# Patient Record
Sex: MALE | Race: WHITE | NOT HISPANIC OR LATINO | Employment: OTHER | URBAN - METROPOLITAN AREA
[De-identification: names, ages, dates, MRNs, and addresses within clinical notes are randomized per-mention and may not be internally consistent; named-entity substitution may affect disease eponyms.]

---

## 2020-08-06 ENCOUNTER — OFFICE VISIT (OUTPATIENT)
Dept: VASCULAR SURGERY | Facility: CLINIC | Age: 60
End: 2020-08-06
Payer: COMMERCIAL

## 2020-08-06 DIAGNOSIS — I83.813 VARICOSE VEINS OF BOTH LOWER EXTREMITIES WITH PAIN: Primary | ICD-10-CM

## 2020-08-06 PROCEDURE — 99203 OFFICE O/P NEW LOW 30 MIN: CPT | Performed by: SURGERY

## 2020-08-06 NOTE — LETTER
8/6/2020         RE: Yann Mcghee  [address] [address] [place] [id number]        Dear Colleague,    Thank you for referring your patient, Yann Mcghee, [facility] [place]. Please see a copy of my visit note below.    [facility]    HPI:    Yann Mcghee is a pleasant 60 year old male who presents with complaints of bilateral lower extremity pain and varicose veins.  He has had varicose veins since the age of 31 and is seen them slowly enlarging time.    He wore compression hose for about 6 months intermittently without any significant improvement in his symptoms.  He denies deep vein thrombosis, superficial thrombophlebitis or hemorrhage.  He does note that his right greater than left ankles have become much more purple over the last couple of years.    The patient states that his father had very bad varicose veins.    PAST MEDICAL HISTORY:   Past Medical History:   Diagnosis Date     Ulcerative colitis, unspecified        PAST SURGICAL HISTORY:   Past Surgical History:   Procedure Laterality Date     C DECOMPRESS FASCIOTOMY LEG,ANT/LAT       HC COLONOSCOPY W BIOPSY  1998     REMOVAL OF SPERM DUCT(S)      Vasectomy       FAMILY HISTORY: History reviewed. No pertinent family history.    SOCIAL HISTORY:   Social History     Tobacco Use     Smoking status: Never Smoker   Substance Use Topics     Alcohol use: Yes     Alcohol/week: 5.0 standard drinks       REVIEW OF SYSTEMS: Review Of Systems  Skin: negative  Eyes: negative  Ears/Nose/Throat: negative  Respiratory: No shortness of breath, dyspnea on exertion, cough, or hemoptysis  Cardiovascular: negative  Gastrointestinal: heartburn  Genitourinary: negative  Musculoskeletal: negative and leg pain  Neurologic: negative  Psychiatric: negative  Hematologic/Lymphatic/Immunologic: negative  Endocrine: negative      Vital signs:  There were no vitals taken for this visit.    No current outpatient medications on  file.       PHYSICAL EXAM:  General: Pleasant, NAD.   HEENT: Normocephalic, atraumatic, external ears and nose normal.   Respiratory: Normal respiratory effort.   Cardiovascular: Pulse is regular.   Musculoskeletal: Gait and station normal.  The joints of his fingers and toes without deformity.  There is no cyanosis of his nailbeds.   EXTREMITIES: Right lower extremity: 6 to 8 mm varicosities coursing from the right proximal medial calf down to the anteromedial right foot with corona phlebectasia of his right medial ankle.  There is slight edema of his right ankle in the area of his large varicosities over the right medial malleolus.  Left lower extremity: 6 to 8 mm varicosities in a cluster about the left medial/posterior medial calf extending distally.  Corona phlebectasia is present of the left medial ankle, not as significant as the right.  PULSES: R/L (3=normal pulse, 0=no palpable pulse) dorsalis pedis: 3/3; posterior tibial: 3/3.      Neurologic: Grossly normal  Psychiatric: Mood, affect, judgment and insight are normal     ASSESSMENT:  Symptomatic, progressive bilateral lower extremity varicose veins.  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  Compression hose have been used in the past but were not beneficial to him, did not control his symptoms.    We discussed the pathophysiology of chronic venous insufficiency and the option of continued conservative management with use of compression hose, elevation, dietary measures and exercise.    If he wishes to pursue this further, he will need bilateral extremity venous competency study.  Further treatment will be based on findings.    If we found significant superficial axial incompetence, he may need endovenous ablation of incompetent veins.  We discussed details of radiofrequency ablation including risks of bleeding, infection, nerve injury, scarring, hyperpigmentation,  deep vein thrombosis, recanalization of the great saphenous vein and recurrent varicose veins.  Both he and his wife voiced understanding and he wishes to proceed with the ultrasound.    PLAN:  Bilateral lower extremity venous competency study     Beni Guevara MD    Dictated using Dragon voice recognition software which may result in transcription errors        [facility] PAT[facility]:                Again, thank you for allowing me to participate in the care of your patient.        Sincerely,        Beni Guevara MD

## 2020-08-06 NOTE — PROGRESS NOTES
[facility]    HPI:    Yann Mcghee is a pleasant 60 year old male who presents with complaints of bilateral lower extremity pain and varicose veins.  He has had varicose veins since the age of 31 and is seen them slowly enlarging time.    He wore compression hose for about 6 months intermittently without any significant improvement in his symptoms.  He denies deep vein thrombosis, superficial thrombophlebitis or hemorrhage.  He does note that his right greater than left ankles have become much more purple over the last couple of years.    The patient states that his father had very bad varicose veins.    PAST MEDICAL HISTORY:   Past Medical History:   Diagnosis Date     Ulcerative colitis, unspecified        PAST SURGICAL HISTORY:   Past Surgical History:   Procedure Laterality Date     C DECOMPRESS FASCIOTOMY LEG,ANT/LAT       HC COLONOSCOPY W BIOPSY  1998     REMOVAL OF SPERM DUCT(S)      Vasectomy       FAMILY HISTORY: History reviewed. No pertinent family history.    SOCIAL HISTORY:   Social History     Tobacco Use     Smoking status: Never Smoker   Substance Use Topics     Alcohol use: Yes     Alcohol/week: 5.0 standard drinks       REVIEW OF SYSTEMS: Review Of Systems  Skin: negative  Eyes: negative  Ears/Nose/Throat: negative  Respiratory: No shortness of breath, dyspnea on exertion, cough, or hemoptysis  Cardiovascular: negative  Gastrointestinal: heartburn  Genitourinary: negative  Musculoskeletal: negative and leg pain  Neurologic: negative  Psychiatric: negative  Hematologic/Lymphatic/Immunologic: negative  Endocrine: negative      Vital signs:  There were no vitals taken for this visit.    No current outpatient medications on file.       PHYSICAL EXAM:  General: Pleasant, NAD.   HEENT: Normocephalic, atraumatic, external ears and nose normal.   Respiratory: Normal respiratory effort.   Cardiovascular: Pulse is regular.   Musculoskeletal: Gait and station normal.  The joints of his  fingers and toes without deformity.  There is no cyanosis of his nailbeds.   EXTREMITIES: Right lower extremity: 6 to 8 mm varicosities coursing from the right proximal medial calf down to the anteromedial right foot with corona phlebectasia of his right medial ankle.  There is slight edema of his right ankle in the area of his large varicosities over the right medial malleolus.  Left lower extremity: 6 to 8 mm varicosities in a cluster about the left medial/posterior medial calf extending distally.  Corona phlebectasia is present of the left medial ankle, not as significant as the right.  PULSES: R/L (3=normal pulse, 0=no palpable pulse) dorsalis pedis: 3/3; posterior tibial: 3/3.      Neurologic: Grossly normal  Psychiatric: Mood, affect, judgment and insight are normal     ASSESSMENT:  Symptomatic, progressive bilateral lower extremity varicose veins.  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  Compression hose have been used in the past but were not beneficial to him, did not control his symptoms.    We discussed the pathophysiology of chronic venous insufficiency and the option of continued conservative management with use of compression hose, elevation, dietary measures and exercise.    If he wishes to pursue this further, he will need bilateral extremity venous competency study.  Further treatment will be based on findings.    If we found significant superficial axial incompetence, he may need endovenous ablation of incompetent veins.  We discussed details of radiofrequency ablation including risks of bleeding, infection, nerve injury, scarring, hyperpigmentation, deep vein thrombosis, recanalization of the great saphenous vein and recurrent varicose veins.  Both he and his wife voiced understanding and he wishes to proceed with the ultrasound.    PLAN:  Bilateral lower extremity venous competency study     Beni Pruitt  MD [place]    Dictated using Dragon voice recognition software which may result in transcription errors        [facility] PAT[facility]:

## 2020-08-17 ENCOUNTER — OFFICE VISIT (OUTPATIENT)
Dept: VASCULAR SURGERY | Facility: CLINIC | Age: 60
End: 2020-08-17
Attending: SURGERY
Payer: COMMERCIAL

## 2020-08-17 ENCOUNTER — ANCILLARY PROCEDURE (OUTPATIENT)
Dept: ULTRASOUND IMAGING | Facility: CLINIC | Age: 60
End: 2020-08-17
Attending: SURGERY
Payer: COMMERCIAL

## 2020-08-17 DIAGNOSIS — I83.813 VARICOSE VEINS OF BOTH LOWER EXTREMITIES WITH PAIN: Primary | ICD-10-CM

## 2020-08-17 DIAGNOSIS — I83.813 VARICOSE VEINS OF BOTH LOWER EXTREMITIES WITH PAIN: ICD-10-CM

## 2020-08-17 PROCEDURE — 93970 EXTREMITY STUDY: CPT | Performed by: SURGERY

## 2020-08-17 PROCEDURE — 99213 OFFICE O/P EST LOW 20 MIN: CPT | Performed by: SURGERY

## 2020-08-17 NOTE — PROGRESS NOTES
[facility] Note    Yann Mcghee presents in follow-up of bilateral lower extremity pain and varicose veins.  Please see my consultation of 8/6/2020 for details.  He returned today for bilateral extremity venous competency study.    Physical Exam  General: Pleasant male in no acute distress.  Blood pressure 189/118 (repeated- 218/109), pulse 82  Extremities: Right lower extremity: 8 mm varicosities over the distal medial right calf with 6 mm varicosities from the proximal medial right calf extending distally.  Several small eschars are noted over his anterior/anteromedial right leg secondary to pruritus and scratching.  Phlebectasia of the right medial ankle.  Trace edema of the right ankle.  Left lower extremity: 6 to 8 mm varicosities from the left proximal medial calf down to just above the left ankle.  Corona phlebectasia of the left medial ankle.  No significant edema.    He has easily palpable dorsalis pedis and posterior tibial pulses    Ultrasound:  Right lower extremity: No evidence of deep vein thrombosis.  His right common femoral and proximal femoral veins are incompetent but the remaining deep vein valves are competent.    The right great saphenous vein is incompetent from the saphenofemoral junction to the ankle, measures 12.4 mm diameter distally with reflux time of 2.9 seconds.  It is the source of multiple incompetent vein branches measuring up to 8.1 mm in diameter with reflux time of 2.5 seconds of the calf segment the great saphenous vein.    The right small saphenous vein is incompetent, measures 4.9 mm in diameter with reflux time of 3.2 seconds.    The right Vein of Giacomini is incompetent but small, the right ankle accessory saphenous vein is competent.    There is a 4.5 mm diameter incompetent  indicating with the small saphenous vein on the mid posterior calf.    Left lower extremity: No evidence of deep vein thrombosis.  Left common femoral through mid femoral veins  are incompetent but the remaining deep vein valves are competent.    The left great saphenous vein is incompetent, measures 11.3 mm in diameter distally with reflux time of 3.6 seconds.  It is the source of multiple incompetent vein branches measuring up to 8.5 mm in diameter with reflux time of 2.4 seconds of the knee segment of the great saphenous vein.    Left small saphenous vein is competent except for short segment distally.  The left anterior especially saphenous vein is competent.  The Vein of Giacomini is not visualized.    He has several small incompetent perforators.    Assessment:  CEAP 3 bilateral extremity chronic venous insufficiency primarily due to great saphenous vein incompetence.  We discussed options of continued conservative management with compression hose, elevation, dietary measures and exercise.  These symptoms have progressed despite the use of compression hose, elevation and exercise.    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    Risk of conservative management including superficial thrombophlebitis, bleeding and progression of the disease process were discussed.  If he chose treatment, he would be a candidate for radiofrequency ablation of his great saphenous veins bilaterally.  I would likely not treat his right small saphenous vein at this time.    Details of procedure including risks of bleeding, infection, nerve injury, scarring, hyperpigmentation, deep antibiosis, recanalization of the great saphenous vein and recurrent varicose veins were discussed.  He voiced understanding and his questions were answered.  I will perform concomitant phlebectomies given the large size of his varicosities.    Significant hypertension on today's exam.  I have asked him to check his blood pressure 2-3 times weekly at the same time of day each time and to notify Dr. Bhargav Mccabe if his blood  pressure is greater than 140/80.  Also talked about limiting sodium intake and beginning exercise.    Plan:  Radiofrequency ablation of his great saphenous veins with bilateral phlebectomies.  We will treat his right leg initially to be followed 2 to 4 weeks later by treatment of his left lower extremity.  Risks and details were discussed.  He voiced understanding and his questions were answered.    Please see above recommendations for hypertension.    Beni Guevara MD    Dictated using Dragon voice recognition software which may result in transcription errors.

## 2020-08-17 NOTE — LETTER
8/17/2020         RE: Yann Mcghee  [address] [address] [place] [id number]        Dear Colleague,    Thank you for referring your patient, Yann Mcghee, [facility] [place]. Please see a copy of my visit note below.    [facility]    Yann Mcghee presents in follow-up of bilateral lower extremity pain and varicose veins.  Please see my consultation of 8/6/2020 for details.  He returned today for bilateral extremity venous competency study.    Physical Exam  General: Pleasant male in no acute distress.  Blood pressure 189/118 (repeated- 218/109), pulse 82  Extremities: Right lower extremity: 8 mm varicosities over the distal medial right calf with 6 mm varicosities from the proximal medial right calf extending distally.  Several small eschars are noted over his anterior/anteromedial right leg secondary to pruritus and scratching.  Phlebectasia of the right medial ankle.  Trace edema of the right ankle.  Left lower extremity: 6 to 8 mm varicosities from the left proximal medial calf down to just above the left ankle.  Corona phlebectasia of the left medial ankle.  No significant edema.    He has easily palpable dorsalis pedis and posterior tibial pulses    Ultrasound:  Right lower extremity: No evidence of deep vein thrombosis.  His right common femoral and proximal femoral veins are incompetent but the remaining deep vein valves are competent.    The right great saphenous vein is incompetent from the saphenofemoral junction to the ankle, measures 12.4 mm diameter distally with reflux time of 2.9 seconds.  It is the source of multiple incompetent vein branches measuring up to 8.1 mm in diameter with reflux time of 2.5 seconds of the calf segment the great saphenous vein.    The right small saphenous vein is incompetent, measures 4.9 mm in diameter with reflux time of 3.2 seconds.    The right Vein of Giacomini is incompetent but small, the right ankle  accessory saphenous vein is competent.    There is a 4.5 mm diameter incompetent  indicating with the small saphenous vein on the mid posterior calf.    Left lower extremity: No evidence of deep vein thrombosis.  Left common femoral through mid femoral veins are incompetent but the remaining deep vein valves are competent.    The left great saphenous vein is incompetent, measures 11.3 mm in diameter distally with reflux time of 3.6 seconds.  It is the source of multiple incompetent vein branches measuring up to 8.5 mm in diameter with reflux time of 2.4 seconds of the knee segment of the great saphenous vein.    Left small saphenous vein is competent except for short segment distally.  The left anterior especially saphenous vein is competent.  The Vein of Giacomini is not visualized.    He has several small incompetent perforators.    Assessment:  CEAP 3 bilateral extremity chronic venous insufficiency primarily due to great saphenous vein incompetence.  We discussed options of continued conservative management with compression hose, elevation, dietary measures and exercise.  These symptoms have progressed despite the use of compression hose, elevation and exercise.    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    Risk of conservative management including superficial thrombophlebitis, bleeding and progression of the disease process were discussed.  If he chose treatment, he would be a candidate for radiofrequency ablation of his great saphenous veins bilaterally.  I would likely not treat his right small saphenous vein at this time.    Details of procedure including risks of bleeding, infection, nerve injury, scarring, hyperpigmentation, deep antibiosis, recanalization of the great saphenous vein and recurrent varicose veins were discussed.  He voiced understanding and his questions were answered.  I will  perform concomitant phlebectomies given the large size of his varicosities.    Plan:  Radiofrequency ablation of his great saphenous veins with bilateral phlebectomies.  We will treat his right leg initially to be followed 2 to 4 weeks later by treatment of his left lower extremity.  Risks and details were discussed.  He voiced understanding and his questions were answered.    Beni Guevara MD    Dictated using Dragon voice recognition software which may result in transcription errors.            Again, thank you for allowing me to participate in the care of your patient.        Sincerely,        Beni Guevara MD

## 2020-08-17 NOTE — Clinical Note
First procedure: Radiofrequency ablation of the right great saphenous vein with 10-20 medically necessary phlebectomies, 1-1/2-hour for procedure  Second procedure: Radiofrequency ablation of the left great saphenous vein with 10-20 medically necessary phlebectomies, 1-1/2 hours for procedure.

## 2020-10-21 DIAGNOSIS — I83.813 VARICOSE VEINS OF BOTH LOWER EXTREMITIES WITH PAIN: Primary | ICD-10-CM

## 2020-11-13 ENCOUNTER — TELEPHONE (OUTPATIENT)
Dept: VASCULAR SURGERY | Facility: CLINIC | Age: 60
End: 2020-11-13

## 2020-11-13 DIAGNOSIS — I83.813 VARICOSE VEINS OF BOTH LOWER EXTREMITIES WITH PAIN: Primary | ICD-10-CM

## 2020-11-13 NOTE — TELEPHONE ENCOUNTER
[facility]    Preoperative Nurse Call    Procedure: Right GSV with phlebs  Date: 11/23/2020  Time: 0900  Check in time: 0800    Called and left detailed message. Informed patient: when to check in (0800) to sign consent, to bring their preop medications in their original bottle with them (3mg ativan, 0.1mg clonidine). Patient will take the medications after signing the consent to the procedure. Instructed patient to wear a mask and wear loose-fitting comfortable clothing. Ensured patient has a /someone that will be responsible for them the rest of the day. No visitors are allowed in the clinic, so  can either stay in the parking lot or leave. If  does leave, IF POSSIBLE, we ask that they do not go more than 15-20 mins from our clinic. Once procedure is completed, we will keep patient in recovery for 30-45 mins, and call  with aftercare instructions. Informed patient, that if possible, they should sit in the backseat to elevate their leg on the ride home.    Pt needs thigh-high compression hose for procedure. Status of the hose: patient's hose order has been faxed 10/21. Patient can call [facility] in [place] if he has not receive his socks by now.    Special instructions: Patient will have 2 procedures, 2nd procedure scheduling is pending. Therefore 2 sets of medication in each bottle of clonidine (C as in cat) and lorazepam (L as in lion) were sent to [facility] in [facility]. Patient will be instructed to take one of the 2 sets after signed consent. 2nd set will be saved for when the 2nd procedure is scheduled.      Special COVID-19 instructions: Patient aware they need to wear a mask to our clinic and during their procedure. Patient aware that their  cannot come into the clinic and must wait in car. Nurse will call pt's  when procedure is over.    Patient understands that if they have any of the following symptoms (fever, cough, shortness of breath, rash), they need to notify us  immediately to cancel their procedure and will have to reschedule for a later date.    Patient is in agreement with preoperative information and has no further questions.    Jb Johnson RN

## 2020-11-17 RX ORDER — CLONIDINE HYDROCHLORIDE 0.1 MG/1
TABLET ORAL
Qty: 2 TABLET | Refills: 0 | Status: SHIPPED | OUTPATIENT
Start: 2020-11-17 | End: 2021-02-01

## 2020-11-17 RX ORDER — LORAZEPAM 1 MG/1
TABLET ORAL
Qty: 6 TABLET | Refills: 0 | Status: SHIPPED | OUTPATIENT
Start: 2020-11-17 | End: 2021-02-01

## 2020-11-17 NOTE — TELEPHONE ENCOUNTER
Called and left a voicemail message with the same information as last call (see last note). Remind patient clinic is still working on sending his 2 preop medications to the pharmacy. Patient to call clinic this Thursday or Friday if his pharmacy has not call him to  the medication. Otherwise, will see patient on Monday.

## 2020-12-18 ENCOUNTER — TELEPHONE (OUTPATIENT)
Dept: VASCULAR SURGERY | Facility: CLINIC | Age: 60
End: 2020-12-18

## 2020-12-18 NOTE — TELEPHONE ENCOUNTER
[facility]    Preoperative Nurse Call    Procedure: Right GSV with phlebs  Date: 12/21/2020  Time: 0900  Check in time: 0800    Called and spoke to patient/left detailed message. Informed patient: when to check in (0800) to sign consent, to bring their preop medications in their original bottle with them (3mg ativan, 0.1mg clonidine). Patient will take the medications after signing the consent to the procedure. Instructed patient to wear a mask, wear loose-fitting comfortable clothing, and bring their compression hose. Ensured patient has a /someone that will be responsible for them the rest of the day. No visitors are allowed in the clinic, so  can either stay in the parking lot or leave. If  does leave, IF POSSIBLE, we ask that they do not go more than 15-20 mins from our clinic. Once procedure is completed, we will keep patient in recovery for 30-45 mins, and call  with aftercare instructions. Informed patient, that if possible, they should sit in the backseat to elevate their leg on the ride home.    Pt needs thigh-high compression hose for procedure. Status of the hose: patient has them.    Special instructions: Informed patient sedation meds sent to pharmacy [facility] in [facility]. Patient will pick them up.    Special COVID-19 instructions: Patient aware they need to wear a mask to our clinic and during their procedure. Patient aware that their  cannot come into the clinic and must wait in car. Nurse will call pt's  when procedure is over.    Patient understands that if they have any of the following symptoms (fever, cough, shortness of breath, rash), they need to notify us immediately to cancel their procedure and will have to reschedule for a later date.    Patient is in agreement with preoperative information and has no further questions.    Jb Johnson RN

## 2020-12-21 ENCOUNTER — OFFICE VISIT (OUTPATIENT)
Dept: VASCULAR SURGERY | Facility: CLINIC | Age: 60
End: 2020-12-21
Payer: COMMERCIAL

## 2020-12-21 VITALS — SYSTOLIC BLOOD PRESSURE: 138 MMHG | HEART RATE: 67 BPM | OXYGEN SATURATION: 95 % | DIASTOLIC BLOOD PRESSURE: 98 MMHG

## 2020-12-21 DIAGNOSIS — I83.811 VARICOSE VEINS OF RIGHT LOWER EXTREMITY WITH PAIN: ICD-10-CM

## 2020-12-21 PROCEDURE — 36475 ENDOVENOUS RF 1ST VEIN: CPT | Mod: RT | Performed by: SURGERY

## 2020-12-21 PROCEDURE — 37766 PHLEB VEINS - EXTREM 20+: CPT | Mod: RT | Performed by: SURGERY

## 2020-12-21 RX ORDER — HYDROCODONE BITARTRATE AND ACETAMINOPHEN 5; 325 MG/1; MG/1
1 TABLET ORAL EVERY 6 HOURS PRN
Qty: 2 TABLET | Refills: 0 | Status: SHIPPED | OUTPATIENT
Start: 2020-12-21 | End: 2021-02-01

## 2020-12-21 NOTE — NURSING NOTE
Patient's diastolic blood pressure in the high 90's-110's. No complaint of symptoms. VSS remain stable. Encouraged patient to see his PCP to follow up, as also has been encouraged by Dr. Guevara on initial visit. Patient acknowledges understanding.

## 2020-12-21 NOTE — PROGRESS NOTES
[facility] Procedure Note    Indications:  Right leg pain and varicose veins recalcitrant to conservative measures    Procedure:  1. Radiofrequency ablation right great saphenous vein  2. Multiple, medically necessary stab phlebectomies right lower extremity (greater than 20 stabs)    Surgeon  CALVIN Guevara MD    Procedure Description  Details of the procedure including risks of bleeding, infection, nerve injury, scarring, hyperpigmentation, deep vein thrombosis, recanalization of the great saphenous vein and recurrent varicose veins all discussed.  The patient voiced understanding and wished to proceed.  Informed consent was obtained.     I had the patient stand and marked varicosities coursing from the right knee down to the dorsum of his right foot with indelible marker.  We then proceeded the operating room, had the patient lie supine on the operating table, then prepped and draped the right lower extremity sterilely.    We took a timeout to confirm the appropriate operative site and procedure: Radiofrequency ablation of the right great saphenous vein with medically necessary phlebectomies    VNUS Closure  I imaged the right lower extremity easily identifying the right great saphenous vein, noting that it was continuous down to approximately 8 cm below the knee where it broke through the fascia and became varicosities coursing down the leg.  The vein was also somewhat superficial in the distal thigh and proximal leg.  I infiltrated the skin overlying the vein approximately 8 cm below the knee, placed a micropuncture needle into the vein followed by a micropuncture guidewire and a 7 Wol[place] sheath.    We passed the closure fast device through the sheath, positioning of the tip of the device 1.87 centimeters from the saphenofemoral junction under ultrasound guidance with the operating table in Trendelenburg position.  Tumescent anesthetic was injected along the course of the vein under ultrasound guidance  with care to confirm catheter tip position 1.87 cm from the saphenofemoral junction prior to infiltrating the tissues in this location.  Where the vein was slightly more superficial in the distal thigh and proximal calf, I instilled after tumescent to deepen the vein to greater than 1 cm deep to the skin.  The same tumescent anesthetic was injected around each of the marked varicosities.    After allowing the block to take effect and after confirming appropriate position, I applied compression to the proximal thigh great saphenous vein with the ultrasound probe and additional width 2 fingerbreadths treating the first two 7 cm increments of the vein with 2 RF sessions each.  The remaining vein was treated with 1 RF session to each 7cm increment with the exception of segments of vein where energy readings were higher requiring additional treatments.  We treated down to approximately 8 cm below the knee.  The patient was comfortable throughout.    After completing the pullback, I reimaged the vein and the vein to be non-compressible and closed.  The saphenofemoral junction and common femoral veins were fully compressible and free of thrombus. The sheath and the catheter were removed and hemostasis secured with pressure.    Phlebectomies  We made stab wounds beside each of the marked varicosities with 11 scalpel, retrieved the veins with either vein hooks or shanika hooks, clamped them with mosquito clamps and a avulsed them.  Hemostasis was secured with pressure.    After completing the phlebectomies, the leg was cleaned with saline solution and petroleum jelly was applied to the skin.  The leg was then dressed with ABD pads, cast padding and an Ace bandage from the toes to the groin.   We observed the patient for 30 minutes to ensure excellent hemostasis then took him to his car in a wheelchair.  Post procedure instructions were given to the patient and his wife in verbal and written form.  They both voiced  understanding and their questions were answered.    The patient tolerated the procedure well without evidence of allergic reaction or other complications and will return in 72 hours for a right lower extremity venous ultrasound.    Pooja Closure    Date/Time: 2020 10:07 AM  Performed by: Beni Guevara MD  Authorized by: Beni Guevara MD     Time out: Immediately prior to the procedure a time out was called    Preparation: Patient was prepped and draped in usual sterile fashion    1st Assist:  Althea Penn CST/CHRIS  Circulator:  Jb Johnson RN  Procedure:  VNUS  Procedure side:  Right  One Vein    Vein Treated:  GSV  Patient tolerance:  Patient tolerated the procedure well with no immediate complications  Phlebectomy    Date/Time: 2020 10:07 AM  Performed by: Beni Guevara MD  Authorized by: Beni Guevara MD     Procedure:  Phlebectomies  Type:  Medically Necessary  Procedure side:  Right  Stabs:  >20  Patient tolerance:  Patient tolerated the procedure well with no immediate complications  Wrap/Hose:  Wraps        Flowsheet Data 2020   Procedure Start Time:  9:04 AM   Prep: Chloraprep   Side: Right   Tx Length (cm): Right GSV: 52   Junction (cm): Right GSV: 1.87   RF Cycles: Right GSV: 11   RF TX Time (Minutes): Right GSV: 3:40   # PHLEB Sites: Right le   Sedation taken: Yes   Pre Pt. Physical / Cognitive Limitations: WNL   TOTAL Local anesthesia Injected (ml): 3   Max Volume Local Anesthesia (ml): 11   TOTAL Tumescent Injected volume (ml): 462   Max Volume Tumescent (ml): 572   Post Pt. Physical / Cognitive Limitations: WNL   Procedure End Time:  9:59 AM   D/C Instructions given, states readiness to leave and escorted to car: Yes       LILLIAN Guevara MD    Dictated using Dragon voice recognition software which may result in transcription errorsPre-procedure Nursing Note    Yann Mcghee presents to clinic for Vein Procedure  .    /Person Responsible for Patient: Sukhdeep (wife)  Phone Number: [phone] (ok to LVM)    Prophylactic Medication:N/A   Sedation Medication: Ativan, 3 mg ,   Time Taken: 0814 and Clonidine, 1mg,   Time Taken: 0814  Compression Stockings: Patient brought  The procedure is being performed on RLE.  Patient understanding of procedure matches consent? YES    Patient's pre-procedure medications verified by RNJb.    Jb Johnson RN on 12/21/2020 at 8:13 AM

## 2020-12-21 NOTE — LETTER
12/21/2020         RE: Yann Mcghee  [address] [address] [place] [address]  [place] [address]        Dear Colleague,    Thank you for referring your patient, Yann Mcghee, to the [facility] [place]. Please see a copy of my visit note below.        [facility] Procedure Note    Indications:  Right leg pain and varicose veins recalcitrant to conservative measures    Procedure:  1. Radiofrequency ablation right great saphenous vein  2. Multiple, medically necessary stab phlebectomies right lower extremity (greater than 20 stabs)    Surgeon  CALVIN Guevara MD    Procedure Description  Details of the procedure including risks of bleeding, infection, nerve injury, scarring, hyperpigmentation, deep vein thrombosis, recanalization of the great saphenous vein and recurrent varicose veins all discussed.  The patient voiced understanding and wished to proceed.  Informed consent was obtained.     I had the patient stand and marked varicosities coursing from the right knee down to the dorsum of his right foot with indelible marker.  We then proceeded the operating room, had the patient lie supine on the operating table, then prepped and draped the right lower extremity sterilely.    We took a timeout to confirm the appropriate operative site and procedure: Radiofrequency ablation of the right great saphenous vein with medically necessary phlebectomies    VNUS Closure  I imaged the right lower extremity easily identifying the right great saphenous vein, noting that it was continuous down to approximately 8 cm below the knee where it broke through the fascia and became varicosities coursing down the leg.  The vein was also somewhat superficial in the distal thigh and proximal leg.  I infiltrated the skin overlying the vein approximately 8 cm below the knee, placed a micropuncture needle into the vein followed by a micropuncture guidewire and a 7 [place] sheath.    We passed the closure fast device through the  sheath, positioning of the tip of the device 1.87 centimeters from the saphenofemoral junction under ultrasound guidance with the operating table in Trendelenburg position.  Tumescent anesthetic was injected along the course of the vein under ultrasound guidance with care to confirm catheter tip position 1.87 cm from the saphenofemoral junction prior to infiltrating the tissues in this location.  Where the vein was slightly more superficial in the distal thigh and proximal calf, I instilled after tumescent to deepen the vein to greater than 1 cm deep to the skin.  The same tumescent anesthetic was injected around each of the marked varicosities.    After allowing the block to take effect and after confirming appropriate position, I applied compression to the proximal thigh great saphenous vein with the ultrasound probe and additional width 2 fingerbreadths treating the first two 7 cm increments of the vein with 2 RF sessions each.  The remaining vein was treated with 1 RF session to each 7cm increment with the exception of segments of vein where energy readings were higher requiring additional treatments.  We treated down to approximately 8 cm below the knee.  The patient was comfortable throughout.    After completing the pullback, I reimaged the vein and the vein to be non-compressible and closed.  The saphenofemoral junction and common femoral veins were fully compressible and free of thrombus. The sheath and the catheter were removed and hemostasis secured with pressure.    Phlebectomies  We made stab wounds beside each of the marked varicosities with 11 scalpel, retrieved the veins with either vein hooks or shanika hooks, clamped them with mosquito clamps and a avulsed them.  Hemostasis was secured with pressure.    After completing the phlebectomies, the leg was cleaned with saline solution and petroleum jelly was applied to the skin.  The leg was then dressed with ABD pads, cast padding and an Ace bandage  from the toes to the groin.   We observed the patient for 30 minutes to ensure excellent hemostasis then took him to his car in a wheelchair.  Post procedure instructions were given to the patient and his wife in verbal and written form.  They both voiced understanding and their questions were answered.    The patient tolerated the procedure well without evidence of allergic reaction or other complications and will return in 72 hours for a right lower extremity venous ultrasound.    Pooja Closure    Date/Time: 2020 10:07 AM  Performed by: Beni Guevara MD  Authorized by: Beni Guevara MD     Time out: Immediately prior to the procedure a time out was called    Preparation: Patient was prepped and draped in usual sterile fashion    1st Assist:  Althea Penn CST/CHRIS  Circulator:  Jb Johnson RN  Procedure:  VNUS  Procedure side:  Right  One Vein    Vein Treated:  GSV  Patient tolerance:  Patient tolerated the procedure well with no immediate complications  Phlebectomy    Date/Time: 2020 10:07 AM  Performed by: Beni Guevara MD  Authorized by: Beni Guevara MD     Procedure:  Phlebectomies  Type:  Medically Necessary  Procedure side:  Right  Stabs:  >20  Patient tolerance:  Patient tolerated the procedure well with no immediate complications  Wrap/Hose:  Wraps        Flowsheet Data 2020   Procedure Start Time:  9:04 AM   Prep: Chloraprep   Side: Right   Tx Length (cm): Right GSV: 52   Junction (cm): Right GSV: 1.87   RF Cycles: Right GSV: 11   RF TX Time (Minutes): Right GSV: 3:40   # PHLEB Sites: Right le   Sedation taken: Yes   Pre Pt. Physical / Cognitive Limitations: WNL   TOTAL Local anesthesia Injected (ml): 3   Max Volume Local Anesthesia (ml): 11   TOTAL Tumescent Injected volume (ml): 462   Max Volume Tumescent (ml): 572   Post Pt. Physical / Cognitive Limitations: WNL   Procedure End Time:  9:59 AM   D/C Instructions given, states  readiness to leave and escorted to car: Yes       LILLIAN Guevara MD    Dictated using Dragon voice recognition software which may result in transcription errorsPre-procedure Nursing Note    Yann Mcghee presents to clinic for Vein Procedure  .   /Person Responsible for Patient: Sukhdeep (wife)  Phone Number: [phone] (ok to LVM)    Prophylactic Medication:N/A   Sedation Medication: Ativan, 3 mg ,   Time Taken: 0814 and Clonidine, 1mg,   Time Taken: 0814  Compression Stockings: Patient brought  The procedure is being performed on RLE.  Patient understanding of procedure matches consent? YES    Patient's pre-procedure medications verified by RN, Jb Johnson.    Jb Johnson RN on 12/21/2020 at 8:13 AM      Again, thank you for allowing me to participate in the care of your patient.        Sincerely,        Beni Guevara MD

## 2020-12-23 ENCOUNTER — ANCILLARY PROCEDURE (OUTPATIENT)
Dept: ULTRASOUND IMAGING | Facility: CLINIC | Age: 60
End: 2020-12-23
Attending: SURGERY
Payer: COMMERCIAL

## 2020-12-23 ENCOUNTER — OFFICE VISIT (OUTPATIENT)
Dept: VASCULAR SURGERY | Facility: CLINIC | Age: 60
End: 2020-12-23
Attending: SURGERY
Payer: COMMERCIAL

## 2020-12-23 DIAGNOSIS — I83.811 VARICOSE VEINS OF RIGHT LOWER EXTREMITY WITH PAIN: Primary | ICD-10-CM

## 2020-12-23 DIAGNOSIS — I83.813 VARICOSE VEINS OF BOTH LOWER EXTREMITIES WITH PAIN: ICD-10-CM

## 2020-12-23 PROCEDURE — 93971 EXTREMITY STUDY: CPT | Mod: RT | Performed by: SURGERY

## 2020-12-23 PROCEDURE — 99207 PR NO CHARGE NURSE ONLY: CPT

## 2020-12-23 RX ORDER — IBUPROFEN 200 MG
200 TABLET ORAL EVERY 4 HOURS PRN
COMMUNITY

## 2020-12-23 NOTE — LETTER
2020         RE: Yann Mcghee  [address] [address] [place] [address]  [place] [address]        Dear Colleague,    Thank you for referring your patient, Yann Mcghee, to the [facility] [place]. Please see a copy of my visit note below.        Postoperative Nurse Note    Patient is here for their 48 postoperative visit.    Procedure: Called patient and moved appointment to 8:45 arrival for  9:00 US. 72 hour post op Right leg VNUS closure GSV(med nec), 10-20 stab phlebs(med nec)  Procedure Date: on 20   Surgeon: Dr Guevara    Ultrasound Result: Right lower extremity negative for DVT. Right GSV is closed 5.7mm from the SFJ to the distal calf. Acute occlusion thrombus of varicose veins off proximal calf.    Physical Exam: Incisions are approximated without signs of infection.  Ecchymosis:very minimal on right medial thigh and calf near knee  Swelling: no swelling  Paresthesia: denies numbness    Current Pain Ratin/10. Using Ibuprofen for discomfort. States it helps.    Patient Questions or Concerns: Wants to schedule left leg surgery and will be contacting [place] (surgery scheduler)    Reviewed postoperative instructions with patient and provided them with written material of common things to expect from their procedure.     Patient's Next [facility] Appointment: 21 6 week f/u and if 2nd surgery is schedule before this appt this will be rescheduled    Gopal Arguello RN      Again, thank you for allowing me to participate in the care of your patient.        Sincerely,         VEIN NURSE

## 2020-12-23 NOTE — PROGRESS NOTES
Postoperative Nurse Note    Patient is here for their 48 postoperative visit.    Procedure: Called patient and moved appointment to 8:45 arrival for  9:00 US. 72 hour post op Right leg VNUS closure GSV(med nec), 10-20 stab phlebs(med nec)  Procedure Date: on 20   Surgeon: Dr Guevara    Ultrasound Result: Right lower extremity negative for DVT. Right GSV is closed 5.7mm from the SFJ to the distal calf. Acute occlusion thrombus of varicose veins off proximal calf.    Physical Exam: Incisions are approximated without signs of infection.  Ecchymosis:very minimal on right medial thigh and calf near knee  Swelling: no swelling  Paresthesia: denies numbness    Current Pain Ratin/10. Using Ibuprofen for discomfort. States it helps.    Patient Questions or Concerns: Wants to schedule left leg surgery and will be contacting [facility] (surgery scheduler)    Reviewed postoperative instructions with patient and provided them with written material of common things to expect from their procedure.     Patient's Next [facility] Appointment: 21 6 week f/u and if 2nd surgery is schedule before this appt this will be rescheduled    Gopal Arguello RN

## 2021-01-15 ENCOUNTER — HEALTH MAINTENANCE LETTER (OUTPATIENT)
Age: 61
End: 2021-01-15

## 2021-02-01 ENCOUNTER — OFFICE VISIT (OUTPATIENT)
Dept: VASCULAR SURGERY | Facility: CLINIC | Age: 61
End: 2021-02-01
Payer: COMMERCIAL

## 2021-02-01 DIAGNOSIS — I83.813 VARICOSE VEINS OF BOTH LOWER EXTREMITIES WITH PAIN: Primary | ICD-10-CM

## 2021-02-01 PROCEDURE — 99207 PR NO CHARGE LOS: CPT | Performed by: SURGERY

## 2021-02-01 RX ORDER — LOSARTAN POTASSIUM 25 MG/1
25 TABLET ORAL DAILY
COMMUNITY
Start: 2021-01-08

## 2021-02-01 NOTE — LETTER
"    2/1/2021         RE: Yann Mcghee  [address] [address] [place] [address]        Dear Colleague,    Thank you for referring your patient, Yann Mcghee, to the [facility] [place]. Please see a copy of my visit note below.    [facility] vein clinic [place] 6-week postop  Oscar Mcghee returns in 6-week follow-up of radiofrequency ablation of his right great saphenous vein with multiple phlebectomies.  Overall he is doing well and says his leg pain has improved.  He does admit to some \"pulling\" in his right thigh but states this is improving.    Physical exam   Right lower extremity: Phlebectomy sites are healing well.  There is little if any ecchymosis remaining.  There is mild induration along some of the phlebectomy sites.  There is a residual varicosity about the medial proximal calf but otherwise I do not appreciate other significant varicose veins.    Is post procedure ultrasound revealed that the right great saphenous vein was closed 5.7 mm from the saphenofemoral junction to the distal calf.  There was no deep vein thrombosis.    Impression  Good result 6 weeks status post radiofrequency ablation of the right great saphenous vein with phlebectomies.  There is a single residual varicosity about his right proximal medial calf that we can treat with no charge sclerotherapy at the time of his left leg treatment.    He will be taking a trip beginning April 1 and wonders if he should have his left leg treated prior to the trip or following the trip.  I would recommend getting his leg treated after he returns from his vacation.  We will discuss with [facility] or [place]encia in our office to get scheduled.    Plan  Radiofrequency ablation of the left great saphenous vein with medically necessary phlebectomies as previously ordered.  We will perform a limited sclerotherapy of a residual right medial calf varicosity at the same setting.    LILLIAN Guevara MD    Dictated using [place] " voice recognition software which may result in transcription errors      Again, thank you for allowing me to participate in the care of your patient.        Sincerely,        Beni Guevara MD

## 2021-02-01 NOTE — PROGRESS NOTES
"[facility] vein clinic [place] 6-week postop  Oscar Mcghee returns in 6-week follow-up of radiofrequency ablation of his right great saphenous vein with multiple phlebectomies.  Overall he is doing well and says his leg pain has improved.  He does admit to some \"pulling\" in his right thigh but states this is improving.    Physical exam   Right lower extremity: Phlebectomy sites are healing well.  There is little if any ecchymosis remaining.  There is mild induration along some of the phlebectomy sites.  There is a residual varicosity about the medial proximal calf but otherwise I do not appreciate other significant varicose veins.    Is post procedure ultrasound revealed that the right great saphenous vein was closed 5.7 mm from the saphenofemoral junction to the distal calf.  There was no deep vein thrombosis.    Impression  Good result 6 weeks status post radiofrequency ablation of the right great saphenous vein with phlebectomies.  There is a single residual varicosity about his right proximal medial calf that we can treat with no charge sclerotherapy at the time of his left leg treatment.    He will be taking a trip beginning April 1 and wonders if he should have his left leg treated prior to the trip or following the trip.  I would recommend getting his leg treated after he returns from his vacation.  We will discuss with [place] or Clemencia in our office to get scheduled.    Plan  Radiofrequency ablation of the left great saphenous vein with medically necessary phlebectomies as previously ordered.  We will perform a limited sclerotherapy of a residual right medial calf varicosity at the same setting.    LILLIAN Guevara MD    Dictated using Dragon voice recognition software which may result in transcription errors  "

## 2021-10-24 ENCOUNTER — HEALTH MAINTENANCE LETTER (OUTPATIENT)
Age: 61
End: 2021-10-24

## 2022-02-13 ENCOUNTER — HEALTH MAINTENANCE LETTER (OUTPATIENT)
Age: 62
End: 2022-02-13

## 2022-10-16 ENCOUNTER — HEALTH MAINTENANCE LETTER (OUTPATIENT)
Age: 62
End: 2022-10-16

## 2023-03-26 ENCOUNTER — HEALTH MAINTENANCE LETTER (OUTPATIENT)
Age: 63
End: 2023-03-26

## 2024-01-08 ENCOUNTER — OFFICE VISIT (OUTPATIENT)
Dept: VASCULAR SURGERY | Facility: CLINIC | Age: 64
End: 2024-01-08
Payer: COMMERCIAL

## 2024-01-08 DIAGNOSIS — I83.812 VARICOSE VEINS OF LEFT LOWER EXTREMITY WITH PAIN: Primary | ICD-10-CM

## 2024-01-08 PROCEDURE — 99203 OFFICE O/P NEW LOW 30 MIN: CPT | Performed by: SURGERY

## 2024-01-08 RX ORDER — ACETAMINOPHEN 500 MG
500-1000 TABLET ORAL EVERY 6 HOURS PRN
COMMUNITY

## 2024-01-08 NOTE — NURSING NOTE
Patient Reported symptoms:    Left Leg   Heaviness None of the time   Achiness Some of the time   Swelling Most of the time   Throbbing None of the time   Itching None of the time   Appearance Moderately noticeable   Impact on work/activities Symptoms but full able to participate

## 2024-01-08 NOTE — PROGRESS NOTES
[facility] [place] office note    Yann Mcghee returns with left greater than right lower extremity varicose veins with pain.  We treated his right great saphenous vein with radiofrequency ablation and treated branch tributaries with phlebectomies on 12/21/2020.  At that time, he was going to return to have his left leg treated but never returned.    He continues to have pain in the left leg that he describes as an ache, tightness, heaviness with left leg swelling.  The symptoms are significant enough that it keeps him from been able to exercise the way he wants and cause him to have to elevate the leg to control the swelling.  He has used compression hose in the past for more than 3 months but has not found any relief of his symptoms with them.    Exam  Left lower extremity: 6 mm varicosities over the distal medial thigh and medial calf with 1-2+ edema of the ankle and calf.  Scattered stasis hyperpigmentation of the anteromedial left leg.    Right lower extremity: 4 to 5 mm varicosity over the medial right calf, present at his 6-week post procedure visit several years ago.  Trace edema of the right ankle.    Assessment  Left lower extremity varicose veins with pain and swelling.  We discussed the option of continued conservative measures with compression, leg elevation, dietary measures and exercise.  He cannot exercise the way he wants because of the pain and wishes to have this definitively managed.    He is a candidate for endovenous ablation of the left great saphenous vein based on his previous ultrasound.  He will need an up-to-date ultrasound which we will discuss via a virtual visit.    Details of treatment including risks of bleeding, infection, nerve injury, deep vein thrombosis were all discussed.  He voiced understanding of our discussion and his questions were answered.    Plan  Return for left lower extremity venous competency study, results discussed via a virtual visit.    LILLIAN Pruitt  MD [place], FACS    Dictated using Dragon voice recognition software which may result in transcription errors

## 2024-01-08 NOTE — LETTER
1/8/2024         RE: Yann Mcghee  [address] [address] [place], [place] [address] [address]        Dear Colleague,    Thank you for referring your patient, Yann Mcghee, to the [facility] [place]. Please see a copy of my visit note below.    [facility] office note    Yann Mcghee returns with left greater than right lower extremity varicose veins with pain.  We treated his right great saphenous vein with radiofrequency ablation and treated branch tributaries with phlebectomies on 12/21/2020.  At that time, he was going to return to have his left leg treated but never returned.    He continues to have pain in the left leg that he describes as an ache, tightness, heaviness with left leg swelling.  The symptoms are significant enough that it keeps him from been able to exercise the way he wants and cause him to have to elevate the leg to control the swelling.  He has used compression hose in the past for more than 3 months but has not found any relief of his symptoms with them.    Exam  Left lower extremity: 6 mm varicosities over the distal medial thigh and medial calf with 1-2+ edema of the ankle and calf.  Scattered stasis hyperpigmentation of the anteromedial left leg.    Right lower extremity: 4 to 5 mm varicosity over the medial right calf, present at his 6-week post procedure visit several years ago.  Trace edema of the right ankle.    Assessment  Left lower extremity varicose veins with pain and swelling.  We discussed the option of continued conservative measures with compression, leg elevation, dietary measures and exercise.  He cannot exercise the way he wants because of the pain and wishes to have this definitively managed.    He is a candidate for endovenous ablation of the left great saphenous vein based on his previous ultrasound.  He will need an up-to-date ultrasound which we will discuss via a virtual visit.    Details of treatment including risks of bleeding,  infection, nerve injury, deep vein thrombosis were all discussed.  He voiced understanding of our discussion and his questions were answered.    Plan  Return for left lower extremity venous competency study, results discussed via a virtual visit.    LILLIAN Guevara MD, FACS    Dictated using Dragon voice recognition software which may result in transcription errors      Again, thank you for allowing me to participate in the care of your patient.        Sincerely,        Beni Guevara MD

## 2024-01-10 ENCOUNTER — ANCILLARY PROCEDURE (OUTPATIENT)
Dept: ULTRASOUND IMAGING | Facility: CLINIC | Age: 64
End: 2024-01-10
Attending: SURGERY
Payer: COMMERCIAL

## 2024-01-10 PROCEDURE — 93971 EXTREMITY STUDY: CPT | Mod: LT | Performed by: SURGERY

## 2024-01-11 ENCOUNTER — VIRTUAL VISIT (OUTPATIENT)
Dept: VASCULAR SURGERY | Facility: CLINIC | Age: 64
End: 2024-01-11
Attending: SURGERY
Payer: COMMERCIAL

## 2024-01-11 DIAGNOSIS — I83.812 VARICOSE VEINS OF LEFT LOWER EXTREMITY WITH PAIN: Primary | ICD-10-CM

## 2024-01-11 PROCEDURE — 99213 OFFICE O/P EST LOW 20 MIN: CPT | Mod: 95 | Performed by: SURGERY

## 2024-01-11 NOTE — PATIENT INSTRUCTIONS
Pre-Procedure Instructions:  VNUS Closure and Phlebectomies   You are having a Closure(s) - where one or more veins are closed and Phlebectomies - where one or more veins are removed.   Insurance  Precertification and/or referral authorization may be required by your insurance company.  We will call your insurance company to verify benefits for the medically necessary part of your procedure.    Your Current Medications and Allergies  To reduce bruising, please do not take aspirin-type medications (Motrin, Aleve, Ibuprofen, Advil, etc.) for three days before your procedure. You may take Tylenol if you need a pain reliever.  Are you on blood thinner medications? (Plavix, Coumadin, Eliquis, Xarelto) Please discuss this with your surgeon. You may resume taking your blood thinner medication after your procedure.  Are you sensitive to latex or adhesives used for fake fingernails? Please let us know!    Driving Escort and   Please arrange to have a trusted adult (18 years old or older) drive you to and from the clinic.  For your safety, we recommend you have a trusted adult to stay with you until the next morning.    Your Health  If you have a change in your health before the procedure, contact our office immediately. (For example: cold symptoms, cough, urinary tract infection, fever, flu symptoms.)  A pre-procedure physical is not required.    Note  It is sometimes necessary to adjust the procedure schedule due to emergencies. We greatly appreciate your flexibility and understanding in this matter.                  Check List: The Morning of Your Procedure  ___1. Please do not put anything on your leg(s) or shave the day of your procedure.  ___2. You may take your normal medications the day of your procedure.  ___3. It is recommended you eat a light breakfast or lunch the day of your procedure.  ___4. Wear comfortable loose-fitting clothing and wide-fitting shoes (i.e. tennis shoes, slip-ons).  ___5. Please  arrive at our clinic at the specified time given by the nurse.  ___6. You will sign an affirmation of informed consent.  ___7. Bring your pre-procedure sedation medication (lorazepam and clonidine) with you to the clinic.              One hour before your procedure, you will be instructed to take these medications. The lorazepam              (Ativan) lowers anxiety and sedates you; the clonidine makes the lorazepam more effective. Everyone's              body processes these medications differently. Therefore, reactions to these medications vary. Some              people stay awake and some people sleep through the whole procedure. You may not remember              everything about the procedure or the day. You do not want to make any big decisions for the rest of the              day.    The Day of Your Procedure:       VNUS Closure and Phlebectomies  In the Exam Room  A nurse will bring you back to an exam room with your family member or friend. This is when your informed consent will be signed, and you will take your pre-procedure medications.  You will be asked to remove everything from the waist down, including undergarments. You will then put on a hospital gown or shorts and blue booties.  Your surgeon will come in to answer any questions and shefali any bulging varicose veins to be removed.  You will be taken to the restroom to empty your bladder before going into the procedure room.    In the Procedure Room  You will be escorted to the procedure room. You will lie on a procedure table covered with a sheet or blanket.  A nurse will put a blood pressure cuff on your arm and a pulse/oxygen monitor on a finger. Your vital signs will be monitored every 15 minutes.  Your gown will be pulled up slightly and the groin exposed for a short period of time. The surgeon's assistant will clean your foot, leg, and groin with an antibacterial solution. We will get you covered up as quickly as possible!  Sterile towels and blue  drapes will be used to cover you and the table. You will be asked to keep your hands under the blue drapes during the procedure.  The lights will be turned down. The table will be tipped so your head is higher than your feet. You may feel like you're going to slide off, but you won't.    The Procedure  The surgeon will visualize your veins with an ultrasound machine. He or she will then numb your skin and access the vein. A catheter is passed up the vein and positioned with ultrasound guidance. The table will then be tipped head down.  Once the catheter is in the correct position, medication will be injected to numb your leg. You will feel some needle sticks and may feel discomfort as the medication goes in. Once this is done, you should not experience significant discomfort. But if you do, please let us know and more numbing medication can be injected. As the catheter sends out heat, the vein closes off and the catheter is withdrawn.  For the phlebectomy part of the procedure, small incisions are made where the bulging varicose veins have been marked on your leg(s) and these veins will be removed using a small shanika hook instrument.    Post-Procedure  Once the procedure is done, your leg(s) will be washed with warm water and dried. Your leg(s) will be bandaged with large soft dressings and a large ACE bandage wrapped from toes to groin.   You will be offered something to drink and a light snack.  You will rest with your leg(s) elevated for approximately 30 minutes. Your friend or family member may join you.  For your safety, you will be taken to your car in a wheelchair. If you are able to, it is good to keep your leg(s) elevated on the car ride home.    Post-Procedure Instructions:             VNUS Closure and Phlebectomies      Post-Op Day Zero - The Day of Your Procedure:0  1. Medication for Pain Control and Inflammation Control   - The numbing medication injected during your procedure will last for several  hours. The pre-procedure                 tablets may make you very sleepy and you might not remember everything from the procedure or from                 the day. This will usually wear off by the next day.   - Ibuprofen:  If tolerated, take ibuprofen (e.g., Advil) to reduce inflammation whether or not you have                 pain. For three days, take two tablets (200 mg each) with every meal and at bedtime with a snack. If                 your pain is not controlled with ibuprofen, you may take prescription pain medication (such as Norco),                 if prescribed.   - You may resume taking any medications you were taking before your procedure.  2. Activity   - Rest with your leg(s) elevated above your heart. This will prevent from a lot of swelling and                 bleeding. You do not need to elevate your leg(s) while sleeping at night. You may go upstairs, sit up to                 eat, use the bathroom, and take several five-minute walks. Otherwise, keep your leg(s) elevated.                 Minimize the amount of time you are up on your feet to about 30 minutes at a time.  3. Bandages   - The incision sites will be covered with soft bandages and an ACE wrap. Keep your bandages on                 and dry for 48 hours. The ACE should provide  snug  compression but should not cause pain or                 numbness in the toes. If you have significant discomfort or your toes become cold or numb, unwrap                 your ACE and rewrap with less tension starting at the toes wrapping upward.  4. Incisions   - Bleeding: You may see some incision sites that are oozing through the bandages. This is not unusual      and can be managed with Rest, Ice, Compression and Elevation (RICE). Apply ice and firm pressure      directly to the site that is bleeding and rest with your leg(s) elevated above your heart for 20-30                 minutes.    Post-Op Day One:  1. Medication   - Ibuprofen: Continue the same  as the Day of Your Procedure. If your pain is not controlled with                 ibuprofen, you may take prescription pain medication (such as Norco), if prescribed.   2. Activity   - We would like you to get up at least six times and walk around for short periods of time, unless it is      causing you pain. You should not be on your feet more than 90 minutes at a time. Elevate your leg      above your heart when you are not walking.  3. Bandages   - Your bandages must be kept on and dry for 48 hours.  4. Driving   - You may resume driving when you can do so safely. Do not drive if you are taking narcotic pain      medication.  Post-Op Day Two:   1. Medication  - Ibuprofen: Continue the same as the Day of Your Procedure.  2.  Activity  - Walk as tolerated. Elevate as much as possible when not walking.  3. Bandages and Compression  - Remove ACE wrap and padding. Shower and put on your compression hose during waking hours only       for at least 5 days. (Your doctor may instruct you to keep your bandages on until your return     appointment; please follow your doctor's instructions.)  4. Incisions  - Your leg(s) will be bruised; there may be swelling, hard knots under the skin and possibly some     numbness. These will likely resolve over time. If you see  hair-like  strings coming out of your     incisions, do not pull them (this will only cause pain/discomfort). We will trim them when you come     back for your follow-up appointment.  5. Call Us If:   - You see any areas on your leg that are red and angry in appearance.   - You notice any drainage that is milky or cloudy in appearance or that has a foul odor.   - You run a temperature of 100.5 or greater.    Post-Op Day Three:  You will have a follow up appointment 2-4 days post-procedure. At this appointment, you will have an ultrasound and we will check your incisions.     ________________________________________________________________________________________    The Two Weeks Following Your Procedure  1.  Skin Care   - Do not use any lotions, creams or powders on your incisions for 14 days or until the incisions have                 healed.   - Do not soak in a bathtub, hot tub or go swimming for 14 days or until your incisions have healed.  2.  Medications   - You may use ibuprofen or acetaminophen (e.g., Tylenol) as needed for pain or discomfort.  3.  Activity   - Do not lift over 25 pounds. After about two weeks you may resume exercise such as aerobics,                 running, tennis or weightlifting. Use your common sense and ease back into your exercise routine                 slowly.   - You may feel a cord-like tightness along the inside of your leg. Gentle stretching can be helpful.  4. Compression Hose   - Your doctor may instruct you to wear compression for longer than seven days; please follow your                 doctor's instructions. As a comfort measure, you may choose to wear compression for longer than                 required.  5.  Travel   - Do not fly in an airplane for 14 days after your procedure. If you have a long car trip planned within                 two to three weeks following your procedure, stop and walk for a few minutes every two hours.      Periodic ankle pumps during the ride may be helpful.    Six Week Appointment  - At your six-week appointment, you will see your surgeon for an exam and evaluation. This office visit     will be scheduled when you return for post-op day three return appointment.       Return to Work  1.  If you work outside the home, you may return to work in a few days depending on the extent of your        procedure, how you tolerate it, and the type of work you perform.  2.  Paperwork: If your employer requires paperwork or you would like a letter written to your employer, please        let us know. We will complete  disability type forms at no charge. Please allow five business days for forms        to be completed.

## 2024-01-11 NOTE — PROGRESS NOTES
January 11, 2024    Vein Procedure Recommendation    Called and left voicemail for patient.    Dr. Guevara has recommended patient to have the following vein procedure(s):     1. Left leg VNUS closure of GSV and 10-20 Phlebectomies (medically necessary)    2. Right leg  <10 Phlebectomies (medically necessary)    Patient Pre-op Questions: (Per chart review)  Preferred [facility]acy: [facility]  Anticoagulant/ASA: No  Artificial Joint or Heart Valve:  No  Open ulcer:  No  Sedation nausea:  **      Patient is recommended to wear Thigh High compression hose following his procedure. Discussed compression hose. Explained to patient if insurance doesn't cover the compression hose there are a couple different options to getting them and to call the clinic to let us know if they need help.    Entered in patient's After Visit Summary (viewable in [employer]) written procedure instructions to review on his own (see After Visit Summary).    Next steps:    Insurance Submission  Informed patient this process could take up to 14 business days, but once approved, the patient will be contacted by our surgery scheduler to schedule the above procedure. Gave patient our surgery scheduler's information.    Gave patient our call back number if any further questions or concerns.    Roberto Beavers RN  [facility]  Vein Clinic

## 2024-01-11 NOTE — PROGRESS NOTES
Oscar is a 63 year old who is being evaluated via a billable video visit.      How would you like to obtain your AVS? MyChart  If the video visit is dropped, the invitation should be resent by: Text to cell phone: [phone]  Will anyone else be joining your video visit? No    Video-Visit Details    Type of service:  Video Visit    Video visit start time: 9:20 AM    Video visit end time: 9:43 AM    Originating Location (pt. Location): Home    Distant Location (provider location):  On-site  Platform used for Video Visit: [facility] [place] Progress Note    Yann Mcghee presents in follow-up of left greater than right leg varicose veins with pain.  Please see my consultation/office note of 2024.    He returned for a left lower extremity venous competency study on 1/10/2024.    Physical Exam  General: Pleasant male in no acute distress.  Blood pressure 215/111 (he had been instructed to  blood pressure medications by his primary care physician and was to pick them up today), pulse 66  Extremities: Left lower extremity: 6 mm varicosities over the distal medial thigh and medial calf with 1-2+ edema of the ankle and calf.  Scattered stasis hyperpigmentation of the anteromedial left leg.     Ultrasound:  Name:  Yann Mcghee                                                         Patient ID: [id number]  Date: January 10, 2024                                                          : 1960  Sex: male                                                                    Examined by: MILLY Rivera RVT  Age:  63 year old                                                         Reading MD: ROHINI Guevara MD     INDICATION:  Lt leg varicose vein with pain     EXAM TYPE  LEFT LOWER EXTREMITY VENOUS DUPLEX FOR VENOUS INSUFFICIENCY TECHNICAL SUMMARY     A duplex ultrasound study using color flow was performed, to evaluate the left lower extremity veins for valvular  incompetence with the patient in a steep reversed trendelenberg.      LEFT:     The deep veins demonstrate phasic flow, compress and respond to augmentations.  There is no  DVT.  The common femoral vein is incompetent and free of thrombus. The remaining deep veins are competent and free of thrombus.      The GSV demonstrates phasic flow, compresses and responds to augmentations from the saphenofemoral junction to the ankle with no evidence of  thrombus. The great saphenous vein measures 20.8 mm at the saphenofemoral junction, 8.3 mm at the proximal thigh and 8.5 mm at the knee.  The GSV is incompetent from SFJ to Distal Calf , with the greatest reflux time of 3497 milliseconds.  The GSV gives rise to multiple incompetent varicose veins, the largest measures 8.6 mm off the Knee that courses Posteromedial with a reflux time of 1930 milliseconds.      The AASV is competent ( 2.5 mm) draining into the saphenofemoral junction.      The Giacomini vein is absent.     The SSV demonstrates phasic flow, compresses and responds to augmentations from the popliteal space to the ankle.  No thrombus is seen. The saphenopopliteal junction is competent ( 3.2 mm). The SSV is incompetent  at the distal calf with a reflux time of 2442 milliseconds.   The SSV gives rise to multiple incompetent varicose veins, the largest measuring 5.3 mm off the Mid Calf that courses Medial with a reflux time of 3101 milliseconds.     Perforators: There is an incompetent  vein ( 3.5 mm) at mid calf 20 cm from medial mallelous that communicates with an incompetent varicose vein. There is a second incompetent  vein (2.0 mm) at the posterior distal calf 13 cm above the heel that communicates with an incompetent varicose vein.       RIGHT:     The CFV demonstrate phasic flow, compress and respond to augmentations.  There is no DVT.       FINAL SUMMARY:  Left lower extremity:  Deep veins  1.  No deep vein thrombosis  2.  Common femoral  vein incompetence.  The remaining deep vein valves are competent     Superficial veins  1.  No superficial vein thrombosis  2.  Great saphenous vein incompetence from the saphenofemoral junction to the ankle, the source of multiple, incompetent vein branches from the knee to the ankle  3.  Distal calf small saphenous vein incompetence, the source of incompetent varicosities in the distal calf      veins  3.5 mm diameter incompetent  left medial calf communicating with an incompetent vein branch.  2 mm diameter incompetent  on the posterior calf communicating with the incompetent vein branch     Right lower extremity:  Normal phasicity, compression and augmentation with no common femoral vein thrombosis        Incompetence Criteria: Greater than 500 milliseconds reflux in the superficial and  veins and greater than 1000 milliseconds reflux in the deep veins.      LILLIAN Guevara MD, FACS     Assessment:  Chronic venous insufficiency left lower extremity with ongoing pain despite use of compression hose, leg elevation, dietary measures and exercise for years.    We discussed his lower extremity venous ultrasound utilizing a leg vein drawing.  His left great saphenous vein is incompetent from the saphenofemoral junction to the ankle, measures 8.3 mm in diameter in the proximal thigh with reflux time of 3.4 seconds.  Is a source of multiple, incompetent vein branches on mid calf measuring up to 8.6 mm in diameter with reflux time of 1.9 seconds.    The option of continued conservative measures with risk of superficial thrombophlebitis, bleeding and progression of the disease process were discussed.  If he chooses treatment, he is a candidate for radiofrequency ablation left great saphenous vein with medically necessary phlebectomies.  Details of procedure including risks of bleeding, infection, nerve injury, deep vein thrombosis were all discussed.    There are residual,  symptomatic varicosities on his right medial calf which persist following right leg radiofrequency ablation and phlebectomies.  I will perform medically necessary phlebectomies of these veins at the same setting.    Plan:  Radiofrequency ablation of the left great saphenous vein with medically necessary phlebectomies and limited, right medial calf phlebectomies.    Beni Guevara MD, FACS    Dictated using Dragon voice recognition software which may result in transcription errors

## 2024-01-11 NOTE — LETTER
2024         RE: Yann Mcghee  [address] [address] [place] [address]  [place] [address]        Dear Colleague,    Thank you for referring your patient, Yann Mcghee, to the [facility] [place]. Please see a copy of my visit note below.    Oscar is a 63 year old who is being evaluated via a billable video visit.      How would you like to obtain your AVS? MyChart  If the video visit is dropped, the invitation should be resent by: Text to cell phone: [phone]  Will anyone else be joining your video visit? No    Video-Visit Details    Type of service:  Video Visit    Video visit start time: 9:20 AM    Video visit end time: 9:43 AM    Originating Location (pt. Location): Home    Distant Location (provider location):  On-site  Platform used for Video Visit: Getyoo    [facility] [place] Progress Note    Yann Mcghee presents in follow-up of left greater than right leg varicose veins with pain.  Please see my consultation/office note of 2024.    He returned for a left lower extremity venous competency study on 1/10/2024.    Physical Exam  General: Pleasant male in no acute distress.  Blood pressure 215/111 (he had been instructed to  blood pressure medications by his primary care physician and was to pick them up today), pulse 66  Extremities: Left lower extremity: 6 mm varicosities over the distal medial thigh and medial calf with 1-2+ edema of the ankle and calf.  Scattered stasis hyperpigmentation of the anteromedial left leg.     Ultrasound:  Name:  Yann Mcghee                                                         Patient ID: [id number]  Date: January 10, 2024                                                          : 1960  Sex: male                                                                    Examined by: MILLY Rivera RVT  Age:  63 year old                                                         Melanie MD: ROHINI Guevara MD      INDICATION:  Lt leg varicose vein with pain     EXAM TYPE  LEFT LOWER EXTREMITY VENOUS DUPLEX FOR VENOUS INSUFFICIENCY TECHNICAL SUMMARY     A duplex ultrasound study using color flow was performed, to evaluate the left lower extremity veins for valvular incompetence with the patient in a steep reversed trendelenberg.      LEFT:     The deep veins demonstrate phasic flow, compress and respond to augmentations.  There is no  DVT.  The common femoral vein is incompetent and free of thrombus. The remaining deep veins are competent and free of thrombus.      The GSV demonstrates phasic flow, compresses and responds to augmentations from the saphenofemoral junction to the ankle with no evidence of  thrombus. The great saphenous vein measures 20.8 mm at the saphenofemoral junction, 8.3 mm at the proximal thigh and 8.5 mm at the knee.  The GSV is incompetent from SFJ to Distal Calf , with the greatest reflux time of 3497 milliseconds.  The GSV gives rise to multiple incompetent varicose veins, the largest measures 8.6 mm off the Knee that courses Posteromedial with a reflux time of 1930 milliseconds.      The AASV is competent ( 2.5 mm) draining into the saphenofemoral junction.      The Giacomini vein is absent.     The SSV demonstrates phasic flow, compresses and responds to augmentations from the popliteal space to the ankle.  No thrombus is seen. The saphenopopliteal junction is competent ( 3.2 mm). The SSV is incompetent  at the distal calf with a reflux time of 2442 milliseconds.   The SSV gives rise to multiple incompetent varicose veins, the largest measuring 5.3 mm off the Mid Calf that courses Medial with a reflux time of 3101 milliseconds.     Perforators: There is an incompetent  vein ( 3.5 mm) at mid calf 20 cm from medial mallelous that communicates with an incompetent varicose vein. There is a second incompetent  vein (2.0 mm) at the posterior distal calf 13 cm above the heel that  communicates with an incompetent varicose vein.       RIGHT:     The CFV demonstrate phasic flow, compress and respond to augmentations.  There is no DVT.       FINAL SUMMARY:  Left lower extremity:  Deep veins  1.  No deep vein thrombosis  2.  Common femoral vein incompetence.  The remaining deep vein valves are competent     Superficial veins  1.  No superficial vein thrombosis  2.  Great saphenous vein incompetence from the saphenofemoral junction to the ankle, the source of multiple, incompetent vein branches from the knee to the ankle  3.  Distal calf small saphenous vein incompetence, the source of incompetent varicosities in the distal calf      veins  3.5 mm diameter incompetent  left medial calf communicating with an incompetent vein branch.  2 mm diameter incompetent  on the posterior calf communicating with the incompetent vein branch     Right lower extremity:  Normal phasicity, compression and augmentation with no common femoral vein thrombosis        Incompetence Criteria: Greater than 500 milliseconds reflux in the superficial and  veins and greater than 1000 milliseconds reflux in the deep veins.      LILLIAN Guevara MD, FACS     Assessment:  Chronic venous insufficiency left lower extremity with ongoing pain despite use of compression hose, leg elevation, dietary measures and exercise for years.    We discussed his lower extremity venous ultrasound utilizing a leg vein drawing.  His left great saphenous vein is incompetent from the saphenofemoral junction to the ankle, measures 8.3 mm in diameter in the proximal thigh with reflux time of 3.4 seconds.  Is a source of multiple, incompetent vein branches on mid calf measuring up to 8.6 mm in diameter with reflux time of 1.9 seconds.    The option of continued conservative measures with risk of superficial thrombophlebitis, bleeding and progression of the disease process were discussed.  If he chooses  treatment, he is a candidate for radiofrequency ablation left great saphenous vein with medically necessary phlebectomies.  Details of procedure including risks of bleeding, infection, nerve injury, deep vein thrombosis were all discussed.    There are residual, symptomatic varicosities on his right medial calf which persist following right leg radiofrequency ablation and phlebectomies.  I will perform medically necessary phlebectomies of these veins at the same setting.    Plan:  Radiofrequency ablation of the left great saphenous vein with medically necessary phlebectomies and limited, right medial calf phlebectomies.    Beni Guevara MD, FACS    Dictated using Dragon voice recognition software which may result in transcription errors                January 11, 2024    Vein Procedure Recommendation    Called and left voicemail for patient.    Dr. Guevara has recommended patient to have the following vein procedure(s):     1. Left leg VNUS closure of GSV and 10-20 Phlebectomies (medically necessary)    2. Right leg  <10 Phlebectomies (medically necessary)    Patient Pre-op Questions: (Per chart review)  Preferred Pharmacy: [facility]  Anticoagulant/ASA: No  Artificial Joint or Heart Valve:  No  Open ulcer:  No  Sedation nausea:  **      Patient is recommended to wear Thigh High compression hose following his procedure. Discussed compression hose. Explained to patient if insurance doesn't cover the compression hose there are a couple different options to getting them and to call the clinic to let us know if they need help.    Entered in patient's After Visit Summary (viewable in [email or web address]) written procedure instructions to review on his own (see After Visit Summary).    Next steps:    Insurance Submission  Informed patient this process could take up to 14 business days, but once approved, the patient will be contacted by our surgery scheduler to schedule the above procedure. Gave patient our  surgery scheduler's information.    Gave patient our call back number if any further questions or concerns.    Roberto Beavers RN  [facility]  Vein Clinic      Again, thank you for allowing me to participate in the care of your patient.        Sincerely,        Beni Guevara MD

## 2024-01-11 NOTE — PROGRESS NOTES
"Oscar is a 63 year old who is being evaluated via a billable video visit.      How would you like to obtain your AVS? {AVS Preference:259738}  If the video visit is dropped, the invitation should be resent by: {video visit invitation (Optional) :674662}  Will anyone else be joining your video visit? {:599705}  {If patient encounters technical issues they should call [phone] :640808}        {PROVIDER CHARTING PREFERENCE:411421}    Travis Moore is a 63 year old, presenting for the following health issues:  RECHECK (LEFT LEG US RESULTS)    [unfilled]     ***      [unfilled]   {ROS COMP (Optional):701266}      Objective           Vitals:  No vitals were obtained today due to virtual visit.    [unfilled]   {video visit exam brief selected:712435}    {Diagnostic Test Results (Optional):320351}    {AMBULATORY ATTESTATION (Optional):658673}        Video-Visit Details    Type of service:  Video Visit     Originating Location (pt. Location): {video visit patient location:416825::\"Home\"}  {PROVIDER LOCATION On-site should be selected for visits conducted from your clinic location or adjoining [facility] hospital, academic office, or other nearby [facility] building. Off-site should be selected for all other provider locations, including home:365706}  Distant Location (provider location):  {virtual location provider:801850}  Platform used for Video Visit: {Virtual Visit Platforms:475501::\"Compass Engine\"}  Oscar is a 63 year old who is being evaluated via a billable video visit.      How would you like to obtain your AVS? {AVS Preference:339606}  If the video visit is dropped, the invitation should be resent by: {video visit invitation (Optional) :988924}  Will anyone else be joining your video visit? {:095509}  {If patient encounters technical issues they should call [phone] :270604}        {PROVIDER CHARTING PREFERENCE:882099}    Travis Moore is a 63 year old, presenting for the following health issues:  RECHECK (LEFT LEG " "US RESULTS)    @[facility]@     ***      [unfilled]   {ROS COMP (Optional):412099}      Objective           Vitals:  No vitals were obtained today due to virtual visit.    [unfilled]   {video visit exam brief selected:713677}    {Diagnostic Test Results (Optional):484610}    {AMBULATORY ATTESTATION (Optional):965359}        Video-Visit Details    Type of service:  Video Visit     Originating Location (pt. Location): {video visit patient location:157196::\"Home\"}  {PROVIDER LOCATION On-site should be selected for visits conducted from your clinic location or adjoining [facility] hospital, academic office, or other nearby [facility] building. Off-site should be selected for all other provider locations, including home:366254}  Distant Location (provider location):  {virtual location provider:226307}  Platform used for Video Visit: {Virtual Visit Platforms:916094::\"AmWell\"}  Oscar is a 63 year old who is being evaluated via a billable video visit.      How would you like to obtain your AVS? MyChart  If the video visit is dropped, the invitation should be resent by: Text to cell phone: [phone]  Will anyone else be joining your video visit? No  {If patient encounters technical issues they should call [phone] :329036}      Video-Visit Details    Type of service:  Video Visit     Originating Location (pt. Location): {video visit patient location:517421::\"Home\"}  {PROVIDER LOCATION On-site should be selected for visits conducted from your clinic location or adjoining [facility], academic office, or other nearby [facility] building. Off-site should be selected for all other provider locations, including home:135818}  Distant Location (provider location):  {virtual location provider:439004}  Platform used for Video Visit: Doximity    "

## 2024-03-19 DIAGNOSIS — I83.812 VARICOSE VEINS OF LEFT LOWER EXTREMITY WITH PAIN: Primary | ICD-10-CM

## 2024-03-19 NOTE — PROGRESS NOTES
Insurance denied the right leg phlebs portion of the procedure due vein size. Per [place] if the patient would like to proceed with the phlebs it would be an out of pocket cost of $534.      Called and left patient a message in regards to the denial.      Gave patient surgery scheduler call back number.    Felicity Dennis  [facility]  Vein Clinic

## 2024-05-08 ENCOUNTER — TELEPHONE (OUTPATIENT)
Dept: VASCULAR SURGERY | Facility: CLINIC | Age: 64
End: 2024-05-08
Payer: COMMERCIAL

## 2024-05-08 DIAGNOSIS — I83.812 VARICOSE VEINS OF LEFT LOWER EXTREMITY WITH PAIN: Primary | ICD-10-CM

## 2024-05-08 RX ORDER — LORAZEPAM 1 MG/1
TABLET ORAL
Qty: 3 TABLET | Refills: 0 | Status: SHIPPED | OUTPATIENT
Start: 2024-05-08 | End: 2024-05-20

## 2024-05-08 RX ORDER — CLONIDINE HYDROCHLORIDE 0.1 MG/1
TABLET ORAL
Qty: 1 TABLET | Refills: 0 | Status: SHIPPED | OUTPATIENT
Start: 2024-05-08 | End: 2024-05-20

## 2024-05-08 NOTE — TELEPHONE ENCOUNTER
5/8/2024    Vein Clinic Preoperative Nurse Call    Procedure: left leg vnus closure gsv (med nec) 10-20 phlebs (med nec) *pt decline rt leg phlebs*   Date: 5/17/24  Surgeon: Dr. Guevara  Time: 1300  Check in time: 1200    Called patient and left a detailed message. Informed patient: when to check in (1200) to sign consent, to bring their preop medications in their original bottle with them (3mg ativan, 0.1mg clonidine). Patient will take the medications after signing the consent to the procedure. Instructed patient to wear loose-fitting comfortable clothing, and bring their compression hose. Ensured patient has a /someone that will be responsible for them the rest of the day. Once procedure is completed, we will keep patient in recovery for 30-45 mins, and call  with aftercare instructions. Informed patient, that if possible, they should sit in the backseat to elevate their leg on the ride home.    Pt needs Thigh High compression hose for procedure. Status of the hose: patient instructed to call back and update on hose status.    Special instructions: Instructed patient to call and let us know if he has any sedation nausea.     Patient understands if they have any of the following symptoms (fever, cough, shortness of breath, rash), they need to notify us immediately as they may need to cancel their procedure and reschedule for a later date.    Gave patient our call back number if any further questions or concerns.    Colette Guzman RN  [facility]

## 2024-05-09 DIAGNOSIS — I83.812 VARICOSE VEINS OF LEFT LOWER EXTREMITY WITH PAIN: ICD-10-CM

## 2024-05-10 DIAGNOSIS — I83.812 VARICOSE VEINS OF LEFT LOWER EXTREMITY WITH PAIN: ICD-10-CM

## 2024-05-10 RX ORDER — CLONIDINE HYDROCHLORIDE 0.1 MG/1
TABLET ORAL
Qty: 1 TABLET | Refills: 0 | OUTPATIENT
Start: 2024-05-10

## 2024-05-17 ENCOUNTER — OFFICE VISIT (OUTPATIENT)
Dept: VASCULAR SURGERY | Facility: CLINIC | Age: 64
End: 2024-05-17
Payer: COMMERCIAL

## 2024-05-17 VITALS — OXYGEN SATURATION: 95 % | HEART RATE: 64 BPM | DIASTOLIC BLOOD PRESSURE: 83 MMHG | SYSTOLIC BLOOD PRESSURE: 150 MMHG

## 2024-05-17 DIAGNOSIS — I83.812 VARICOSE VEINS OF LEFT LOWER EXTREMITY WITH PAIN: Primary | ICD-10-CM

## 2024-05-17 PROCEDURE — 36475 ENDOVENOUS RF 1ST VEIN: CPT | Mod: LT | Performed by: SURGERY

## 2024-05-17 PROCEDURE — 37765 STAB PHLEB VEINS XTR 10-20: CPT | Mod: LT | Performed by: SURGERY

## 2024-05-17 NOTE — PROGRESS NOTES
Vein Clinic Procedure Note    Indications:  Left leg varicose veins with pain; symptoms recalcitrant to conservative measures    Procedure:  Radiofrequency ablation left great saphenous vein  Multiple, medically necessary phlebectomies left lower extremity (17 stabs)    Surgeon  CALVIN Guevara MD    Procedure Description  Details of the procedure including risks of bleeding, infection, nerve injury, scarring, hyperpigmentation, deep vein thrombosis, recanalization of the great saphenous vein and recurrent varicose veins all discussed.  The patient voiced understanding and wished to proceed.  Informed consent was obtained.     I had the patient stand and marked varicosities from the proximal medial calf extending posteriorly, distally and anterolaterally with an indelible marker.  We then proceeded the operating room, had the patient lie supine on the operating table, then prepped and draped the left lower extremity sterilely.    We took a timeout to confirm the appropriate operative site and procedure: Radiofrequency ablation of the left great saphenous vein with medically necessary phlebectomies    VNUS Closure  I imaged the left lower extremity easily identifying the left great saphenous vein.  It broke into multiple varicosities approximately 4 to 5 cm below the knee.  I infiltrated the skin overlying the vein at this level, placed a micropuncture needle into the vein followed by a micropuncture guidewire and a 6 Cypriot sheath.    We passed the closure fast device through the sheath, positioning of the tip of the device 2.27 centimeters from the saphenofemoral junction under ultrasound guidance with the operating table in Trendelenburg position.  Tumescent anesthetic was injected along the course of the vein under ultrasound guidance with care to confirm catheter tip position prior to infiltrating the tissues in this location.  I was intentional and injecting additional tumescent anesthetic between the distal  thigh great saphenous vein and the skin to create a nice buffer as the vein was only about 5 mm deep to the skin.  The same tumescent anesthetic was also injected around each of the marked varicosities.    After allowing the block to take effect and after confirming appropriate position, I applied compression to the proximal thigh great saphenous vein with the ultrasound probe and additional width 4 fingerbreadths treating the first three 8 cm increments of the vein with 2 RF sessions each.  The remaining vein was treated with 1 RF session to each 8 cm increment with the exception of segments of vein where energy readings were higher requiring additional treatments.  We treated down to just below the knee.  The patient was comfortable throughout.    After completing the pullback, I reimaged the vein and the vein to be non-compressible and closed.  The saphenofemoral junction and common femoral veins were fully compressible and free of thrombus. The sheath and the catheter were removed and hemostasis secured with pressure.    Phlebectomies  We made stab wounds beside each of the marked varicosities with 11 scalpel, retrieved the veins with either vein hooks or shanika hooks, clamped them with mosquito clamps and avulsed them.  Hemostasis was secured with pressure.    After completing the phlebectomies, the leg was cleaned with saline solution and petroleum jelly was applied to the skin.  The leg was then dressed with ABD pads, cast padding and an Ace bandage from the toes to the groin.   We observed the patient for 30 minutes to ensure excellent hemostasis then took the patient to their ride in a wheelchair.  Post procedure instructions were given to the patient and his wife in verbal and written form.  They both voiced understanding and their questions were answered.    The patient tolerated the procedure well without evidence of allergic reaction or other complications and will return in 72 hours for a left lower  extremity venous ultrasound.    Leiter Closure    Date/Time: 2024 2:01 PM    Performed by: Beni Guevara MD  Authorized by: Beni Guevara MD    Time out: Immediately prior to the procedure a time out was called    Preparation: Patient was prepped and draped in usual sterile fashion    1st Assist:  Clementine Jimenez, CST/CSFA    Circulator:  Colette Guzman RN    Procedure:  VNUS  Procedure side:  Left  One Vein    Vein Treated:  GSV  Patient tolerance:  Patient tolerated the procedure well with no immediate complications  Wrap/Hose:  Wraps  Phlebectomy    Date/Time: 2024 2:02 PM    Performed by: Beni Guevara MD  Authorized by: Beni Guevara MD    Procedure:  Phlebectomies  Type:  Medically Necessary  Procedure side:  Left  Stabs:  10-20  Patient tolerance:  Patient tolerated the procedure well with no immediate complications  Wrap/Hose:  Wraps          2024     1:01 PM   Flowsheet Data   Procedure Start Time: 13:01   Prep: Chloraprep   Side: Left   Tx Length (cm): LEFT GSV: 53.5   Junction (cm): LEFT GSV: 2.27   RF Cycles: LEFT GSV: 11   RF TX Time (Minutes): LEFT GSV: 3:40   # PHLEB Sites: LEFT LE   Sedation taken: Yes   Pre Pt. Physical / Cognitive Limitations: WNL   TOTAL Local anesthesia Injected (ml): 2.5   Max Volume Local Anesthesia (ml): 11   TOTAL Tumescent Injected volume (ml): 425   Max Volume Tumescent (ml): 572   Post Pt. Physical / Cognitive Limitations: WNL   Procedure End Time: 13:53   D/C Instructions given, states readiness to leave and escorted to car: Yes     Tumescent Concentration: Total Volume: 572ml - 0.9% Sodium Chloride 500ml Bag +  Lidocaine 1% with Epinephrine 1:100,000: 60ml + 8.4% Sodium Bicarbonate: 12ml    C Sonal Guevara MD    Dictated using Dragon voice recognition software which may result in transcription errors

## 2024-05-17 NOTE — PROGRESS NOTES
Pre-procedure Nursing Note    Yann Mcghee presents to clinic for Vein Procedure  .   /Person Responsible for Patient: Clover (Spouse)  Phone Number: [phone]    Prophylactic Medication:N/A   Sedation Medication: Ativan, 3mg ,   Time Taken: 1203 and Clonidine, 0.1mg,   Time Taken: 1203  Compression Stockings: Patient brought with today.  The procedure is being performed on LLE.  Patient understanding of procedure matches consent? YES    Patient's pre-procedure medications verified by AF.    Colette Guzman RN on 5/17/2024 at 12:17 PM

## 2024-05-17 NOTE — LETTER
5/17/2024         RE: Yann Mcghee  [address] [address] [place], [place] [address] [address]        Dear Colleague,    Thank you for referring your patient, Yann Mcghee, to the [facility] VEIN [facility]IN[facility] [place]. Please see a copy of my visit note below.    Pre-procedure Nursing Note    Yann Mcghee presents to clinic for Vein Procedure  .   /Person Responsible for Patient: Clover (Spouse)  Phone Number: [phone]    Prophylactic Medication:N/A   Sedation Medication: Ativan, 3mg ,   Time Taken: 1203 and Clonidine, 0.1mg,   Time Taken: 1203  Compression Stockings: Patient brought with today.  The procedure is being performed on LLE.  Patient understanding of procedure matches consent? YES    Patient's pre-procedure medications verified by AF.    Colette Guzman RN on 5/17/2024 at 12:17 PM        Vein Clinic Procedure Note    Indications:  Left leg varicose veins with pain; symptoms recalcitrant to conservative measures    Procedure:  Radiofrequency ablation left great saphenous vein  Multiple, medically necessary phlebectomies left lower extremity (17 stabs)    Surgeon  CALVIN Guevara MD    Procedure Description  Details of the procedure including risks of bleeding, infection, nerve injury, scarring, hyperpigmentation, deep vein thrombosis, recanalization of the great saphenous vein and recurrent varicose veins all discussed.  The patient voiced understanding and wished to proceed.  Informed consent was obtained.     I had the patient stand and marked varicosities from the proximal medial calf extending posteriorly, distally and anterolaterally with an indelible marker.  We then proceeded the operating room, had the patient lie supine on the operating table, then prepped and draped the left lower extremity sterilely.    We took a timeout to confirm the appropriate operative site and procedure: Radiofrequency ablation of the left great saphenous vein with medically necessary phlebectomies    VNUS Closure  I  imaged the left lower extremity easily identifying the left great saphenous vein.  It broke into multiple varicosities approximately 4 to 5 cm below the knee.  I infiltrated the skin overlying the vein at this level, placed a micropuncture needle into the vein followed by a micropuncture guidewire and a 6 Italian sheath.    We passed the closure fast device through the sheath, positioning of the tip of the device 2.27 centimeters from the saphenofemoral junction under ultrasound guidance with the operating table in Trendelenburg position.  Tumescent anesthetic was injected along the course of the vein under ultrasound guidance with care to confirm catheter tip position prior to infiltrating the tissues in this location.  I was intentional and injecting additional tumescent anesthetic between the distal thigh great saphenous vein and the skin to create a nice buffer as the vein was only about 5 mm deep to the skin.  The same tumescent anesthetic was also injected around each of the marked varicosities.    After allowing the block to take effect and after confirming appropriate position, I applied compression to the proximal thigh great saphenous vein with the ultrasound probe and additional width 4 fingerbreadths treating the first three 8 cm increments of the vein with 2 RF sessions each.  The remaining vein was treated with 1 RF session to each 8 cm increment with the exception of segments of vein where energy readings were higher requiring additional treatments.  We treated down to just below the knee.  The patient was comfortable throughout.    After completing the pullback, I reimaged the vein and the vein to be non-compressible and closed.  The saphenofemoral junction and common femoral veins were fully compressible and free of thrombus. The sheath and the catheter were removed and hemostasis secured with pressure.    Phlebectomies  We made stab wounds beside each of the marked varicosities with 11 scalpel,  retrieved the veins with either vein hooks or shanika hooks, clamped them with mosquito clamps and avulsed them.  Hemostasis was secured with pressure.    After completing the phlebectomies, the leg was cleaned with saline solution and petroleum jelly was applied to the skin.  The leg was then dressed with ABD pads, cast padding and an Ace bandage from the toes to the groin.   We observed the patient for 30 minutes to ensure excellent hemostasis then took the patient to their ride in a wheelchair.  Post procedure instructions were given to the patient and his wife in verbal and written form.  They both voiced understanding and their questions were answered.    The patient tolerated the procedure well without evidence of allergic reaction or other complications and will return in 72 hours for a left lower extremity venous ultrasound.    Pooja Closure    Date/Time: 5/17/2024 2:01 PM    Performed by: Beni Guevara MD  Authorized by: Beni Guevara MD    Time out: Immediately prior to the procedure a time out was called    Preparation: Patient was prepped and draped in usual sterile fashion    1st Assist:  Clementine Jimenez CST/CSFA    Circulator:  Colette Guzman RN    Procedure:  VNUS  Procedure side:  Left  One Vein    Vein Treated:  GSV  Patient tolerance:  Patient tolerated the procedure well with no immediate complications  Wrap/Hose:  Wraps  Phlebectomy    Date/Time: 5/17/2024 2:02 PM    Performed by: Beni Guevara MD  Authorized by: Beni Guevara MD    Procedure:  Phlebectomies  Type:  Medically Necessary  Procedure side:  Left  Stabs:  10-20  Patient tolerance:  Patient tolerated the procedure well with no immediate complications  Wrap/Hose:  Wraps          5/17/2024     1:01 PM   Flowsheet Data   Procedure Start Time: 13:01   Prep: Chloraprep   Side: Left   Tx Length (cm): LEFT GSV: 53.5   Junction (cm): LEFT GSV: 2.27   RF Cycles: LEFT GSV: 11   RF TX Time  (Minutes): LEFT GSV: 3:40   # PHLEB Sites: LEFT LE   Sedation taken: Yes   Pre Pt. Physical / Cognitive Limitations: WNL   TOTAL Local anesthesia Injected (ml): 2.5   Max Volume Local Anesthesia (ml): 11   TOTAL Tumescent Injected volume (ml): 425   Max Volume Tumescent (ml): 572   Post Pt. Physical / Cognitive Limitations: WNL   Procedure End Time: 13:53   D/C Instructions given, states readiness to leave and escorted to car: Yes     Tumescent Concentration: Total Volume: 572ml - 0.9% Sodium Chloride 500ml Bag +  Lidocaine 1% with Epinephrine 1:100,000: 60ml + 8.4% Sodium Bicarbonate: 12ml    C Sonal Guevara MD    Dictated using Dragon voice recognition software which may result in transcription errors      Again, thank you for allowing me to participate in the care of your patient.        Sincerely,        Beni Guevara MD

## 2024-05-20 ENCOUNTER — ALLIED HEALTH/NURSE VISIT (OUTPATIENT)
Dept: VASCULAR SURGERY | Facility: CLINIC | Age: 64
End: 2024-05-20
Attending: SURGERY
Payer: COMMERCIAL

## 2024-05-20 ENCOUNTER — ANCILLARY PROCEDURE (OUTPATIENT)
Dept: ULTRASOUND IMAGING | Facility: CLINIC | Age: 64
End: 2024-05-20
Attending: SURGERY
Payer: COMMERCIAL

## 2024-05-20 DIAGNOSIS — Z09 POSTOP CHECK: Primary | ICD-10-CM

## 2024-05-20 DIAGNOSIS — I83.812 VARICOSE VEINS OF LEFT LOWER EXTREMITY WITH PAIN: ICD-10-CM

## 2024-05-20 PROCEDURE — 99207 PR NO CHARGE NURSE ONLY: CPT

## 2024-05-20 PROCEDURE — 93971 EXTREMITY STUDY: CPT | Mod: LT | Performed by: SURGERY

## 2024-05-20 NOTE — PROGRESS NOTES
May 20, 2024    Vein Clinic Postoperative Nurse Note    Patient is here for his 72 hour postoperative visit.    Procedure: left leg vnus closure gsv (med nec) 10-20 phlebs (med [facility]) Procedure Date: 5/17/24  Surgeon: Dr. Guevara    Ultrasound Result: Left lower extremity negative for DVT. Left GSV is closed 7.7 mm from SFJ to proximal calf.     Physical Exam: Incisions are approximated without signs of infection.  Ecchymosis: moderate bruising noted throughout left lower extremity  Swelling: none noted  Paresthesia: patient denies    Patient Questions or Concerns: Patient wondering if he should cover one phlebecomy site that appears a little larger. Explained to patient he doesn't need to cover it unless he feels like he should. Explained that everything appears normal and the area will scab and should heal just like the rest. Encouraged patient to call clinic if the area becomes red, warm, any drainage, or increase in pain or swelling. Patient verbalized understanding of information.     Patient don compression hose on his own    Reviewed postoperative instructions with patient and provided him with written material of common things to expect from his procedure.    Patient's Next Vein Clinic Appointment: 6 week follow up 7/12/24.    Colette Guzman RN  [facility]in [facility]

## 2024-06-01 ENCOUNTER — HEALTH MAINTENANCE LETTER (OUTPATIENT)
Age: 64
End: 2024-06-01

## 2024-07-12 ENCOUNTER — OFFICE VISIT (OUTPATIENT)
Dept: VASCULAR SURGERY | Facility: CLINIC | Age: 64
End: 2024-07-12
Payer: COMMERCIAL

## 2024-07-12 DIAGNOSIS — I83.812 VARICOSE VEINS OF LEFT LOWER EXTREMITY WITH PAIN: Primary | ICD-10-CM

## 2024-07-12 PROCEDURE — 99207 PR VEINSOLUTIONS POST OPERATIVE VISIT: CPT | Performed by: SURGERY

## 2024-07-12 NOTE — PROGRESS NOTES
[facility] vein Clinic 6-week postop note    Yann Mcghee returns following radiofrequency ablation of his left great saphenous vein with phlebectomies on 5/17/2024.  Overall he is doing well.  He has noticed numbness on the mid lateral left thigh, however.  He denies back pain or back injuries and has never had this kind of numbness.    Exam  Left lower extremity: Induration  along the distal medial left thigh and proximal medial left calf, the area of phlebectomies.    He has a band of numbness over the mid lateral left thigh over a distance of about 15 cm in length and 3 to 4 cm in width.  No motor dysfunction.  No numbness on the medial thigh or medial leg.    Assessment  Overall healing satisfactorily following the above treatment.  I am unable to explain the numbness on the lateral thigh as being anatomically related to our procedure.  The only possibility is that the compression dressing compressed a nerve on the lateral thigh, leading to the numbness.    I reassured him that the numbness should improve with time but will take up to a year to reach maximal improvement.  Also, the induration of the distal medial thigh and proximal medial calf will improve for 9 months to a year as well.    Plan  1.  Call with a progress report in 6 weeks regarding the numbness of the lateral left thigh.  If the numbness gets worse, he should contact us immediately.  2.  Return for 6-month post procedure left lower extremity ultrasound    C Sonal Guevara MD, FACS    Dictated using Dragon voice recognition software which may result in transcription errors

## 2024-07-12 NOTE — LETTER
7/12/2024      Yann Mcghee  [address] [address] [place] [address]  [place] [address]      Dear Colleague,    Thank you for referring your patient, Yann Mcghee, to the [facility] VEIN CLINIC [place]. Please see a copy of my visit note below.    [facility] vein Clinic 6-week postop note    Yann Mcghee returns following radiofrequency ablation of his left great saphenous vein with phlebectomies on 5/17/2024.  Overall he is doing well.  He has noticed numbness on the mid lateral left thigh, however.  He denies back pain or back injuries and has never had this kind of numbness.    Exam  Left lower extremity: Induration  along the distal medial left thigh and proximal medial left calf, the area of phlebectomies.    He has a band of numbness over the mid lateral left thigh over a distance of about 15 cm in length and 3 to 4 cm in width.  No motor dysfunction.  No numbness on the medial thigh or medial leg.    Assessment  Overall healing satisfactorily following the above treatment.  I am unable to explain the numbness on the lateral thigh as being anatomically related to our procedure.  The only possibility is that the compression dressing compressed a nerve on the lateral thigh, leading to the numbness.    I reassured him that the numbness should improve with time but will take up to a year to reach maximal improvement.  Also, the induration of the distal medial thigh and proximal medial calf will improve for 9 months to a year as well.    Plan  1.  Call with a progress report in 6 weeks regarding the numbness of the lateral left thigh.  If the numbness gets worse, he should contact us immediately.  2.  Return for 6-month post procedure left lower extremity ultrasound    LILLIAN Guevara MD, FACS    Dictated using Dragon voice recognition software which may result in transcription errors      Again, thank you for allowing me to participate in the care of your patient.        Sincerely,        Beni Pruitt  MD [place]

## 2024-08-30 ENCOUNTER — TELEPHONE (OUTPATIENT)
Dept: VASCULAR SURGERY | Facility: CLINIC | Age: 64
End: 2024-08-30
Payer: COMMERCIAL

## 2024-08-30 NOTE — TELEPHONE ENCOUNTER
"Called patient for 6 week follow up regarding left lateral thigh numbness noted at 7/12/24 office visit.    Dr. Guevara's 7/12 notes the plan as follows:  1.  Call with a progress report in 6 weeks regarding the numbness of the lateral left thigh.  If the numbness gets worse, he should contact us immediately.  2.  Return for 6-month post procedure left lower extremity ultrasound    Patient reports that the numbness is the same. He does not think it has worsened or improved.  No other concerns at this time.    RN reviewed plan above to call us for any worsening symptoms. Also reiterated CPN note that \"the numbness should improve with time, but will take up to a year to reach maximal improvement.\"    RN advised patient will update provider of his current status, and call patient back next week to follow up.    Routing to CPN to review update from patient and advise for anything else to be relayed back to the patient.    Roberto Beavers RN    "

## 2025-06-14 ENCOUNTER — HEALTH MAINTENANCE LETTER (OUTPATIENT)
Age: 65
End: 2025-06-14